# Patient Record
Sex: FEMALE | Race: WHITE | Employment: UNEMPLOYED | URBAN - METROPOLITAN AREA
[De-identification: names, ages, dates, MRNs, and addresses within clinical notes are randomized per-mention and may not be internally consistent; named-entity substitution may affect disease eponyms.]

---

## 2017-10-25 ENCOUNTER — OFFICE VISIT (OUTPATIENT)
Dept: ORTHOPEDIC SURGERY | Age: 42
End: 2017-10-25

## 2017-10-25 VITALS
DIASTOLIC BLOOD PRESSURE: 64 MMHG | WEIGHT: 143.08 LBS | SYSTOLIC BLOOD PRESSURE: 107 MMHG | HEART RATE: 81 BPM | HEIGHT: 65 IN | BODY MASS INDEX: 23.84 KG/M2

## 2017-10-25 DIAGNOSIS — S46.811A STRAIN OF SUPRASPINATUS MUSCLE OR TENDON, RIGHT, INITIAL ENCOUNTER: Primary | ICD-10-CM

## 2017-10-25 PROBLEM — S46.819A STRAIN OF SUPRASPINATUS MUSCLE OR TENDON: Status: ACTIVE | Noted: 2017-10-25

## 2017-10-25 PROCEDURE — 99213 OFFICE O/P EST LOW 20 MIN: CPT | Performed by: ORTHOPAEDIC SURGERY

## 2019-03-07 ENCOUNTER — OFFICE VISIT (OUTPATIENT)
Dept: FAMILY MEDICINE CLINIC | Age: 44
End: 2019-03-07
Payer: COMMERCIAL

## 2019-03-07 ENCOUNTER — HOSPITAL ENCOUNTER (OUTPATIENT)
Dept: GENERAL RADIOLOGY | Age: 44
Discharge: HOME | End: 2019-03-07
Payer: COMMERCIAL

## 2019-03-07 VITALS
DIASTOLIC BLOOD PRESSURE: 68 MMHG | WEIGHT: 158 LBS | BODY MASS INDEX: 26.33 KG/M2 | SYSTOLIC BLOOD PRESSURE: 118 MMHG | OXYGEN SATURATION: 98 % | HEART RATE: 74 BPM | HEIGHT: 65 IN

## 2019-03-07 DIAGNOSIS — Z13.220 SCREENING FOR LIPID DISORDERS: ICD-10-CM

## 2019-03-07 DIAGNOSIS — R06.02 SHORTNESS OF BREATH: Primary | ICD-10-CM

## 2019-03-07 DIAGNOSIS — Z11.4 ENCOUNTER FOR SCREENING FOR HIV: ICD-10-CM

## 2019-03-07 DIAGNOSIS — C44.41 BASAL CELL CARCINOMA (BCC) OF SCALP: ICD-10-CM

## 2019-03-07 DIAGNOSIS — Z13.1 SCREENING FOR DIABETES MELLITUS: ICD-10-CM

## 2019-03-07 LAB — HBA1C MFR BLD: 5.5 %

## 2019-03-07 PROCEDURE — 83036 HEMOGLOBIN GLYCOSYLATED A1C: CPT | Performed by: FAMILY MEDICINE

## 2019-03-07 PROCEDURE — 36415 COLL VENOUS BLD VENIPUNCTURE: CPT | Performed by: FAMILY MEDICINE

## 2019-03-07 PROCEDURE — 99203 OFFICE O/P NEW LOW 30 MIN: CPT | Performed by: FAMILY MEDICINE

## 2019-03-07 PROCEDURE — 71046 X-RAY EXAM CHEST 2 VIEWS: CPT

## 2019-03-07 ASSESSMENT — PATIENT HEALTH QUESTIONNAIRE - PHQ9
1. LITTLE INTEREST OR PLEASURE IN DOING THINGS: 0
SUM OF ALL RESPONSES TO PHQ QUESTIONS 1-9: 0
SUM OF ALL RESPONSES TO PHQ QUESTIONS 1-9: 0
SUM OF ALL RESPONSES TO PHQ9 QUESTIONS 1 & 2: 0
2. FEELING DOWN, DEPRESSED OR HOPELESS: 0

## 2019-03-07 ASSESSMENT — ENCOUNTER SYMPTOMS
SPUTUM PRODUCTION: 0
SHORTNESS OF BREATH: 1
WHEEZING: 0

## 2019-03-08 LAB
A/G RATIO: 1.8 (ref 1.1–2.2)
ALBUMIN SERPL-MCNC: 4.5 G/DL (ref 3.4–5)
ALP BLD-CCNC: 68 U/L (ref 40–129)
ALT SERPL-CCNC: 9 U/L (ref 10–40)
ANION GAP SERPL CALCULATED.3IONS-SCNC: 12 MMOL/L (ref 3–16)
AST SERPL-CCNC: 13 U/L (ref 15–37)
BASOPHILS ABSOLUTE: 0.1 K/UL (ref 0–0.2)
BASOPHILS RELATIVE PERCENT: 0.7 %
BILIRUB SERPL-MCNC: <0.2 MG/DL (ref 0–1)
BUN BLDV-MCNC: 11 MG/DL (ref 7–20)
CALCIUM SERPL-MCNC: 9.2 MG/DL (ref 8.3–10.6)
CHLORIDE BLD-SCNC: 102 MMOL/L (ref 99–110)
CHOLESTEROL, TOTAL: 153 MG/DL (ref 0–199)
CO2: 28 MMOL/L (ref 21–32)
CREAT SERPL-MCNC: 0.6 MG/DL (ref 0.6–1.1)
EOSINOPHILS ABSOLUTE: 0.4 K/UL (ref 0–0.6)
EOSINOPHILS RELATIVE PERCENT: 4.8 %
FERRITIN: 43.5 NG/ML (ref 15–150)
GFR AFRICAN AMERICAN: >60
GFR NON-AFRICAN AMERICAN: >60
GLOBULIN: 2.5 G/DL
GLUCOSE BLD-MCNC: 93 MG/DL (ref 70–99)
HCT VFR BLD CALC: 38.1 % (ref 36–48)
HDLC SERPL-MCNC: 60 MG/DL (ref 40–60)
HEMOGLOBIN: 12.6 G/DL (ref 12–16)
HIV AG/AB: NORMAL
HIV ANTIGEN: NORMAL
HIV-1 ANTIBODY: NORMAL
HIV-2 AB: NORMAL
IRON SATURATION: 27 % (ref 15–50)
IRON: 82 UG/DL (ref 37–145)
LDL CHOLESTEROL CALCULATED: 67 MG/DL
LYMPHOCYTES ABSOLUTE: 2.9 K/UL (ref 1–5.1)
LYMPHOCYTES RELATIVE PERCENT: 37.2 %
MCH RBC QN AUTO: 30.3 PG (ref 26–34)
MCHC RBC AUTO-ENTMCNC: 33 G/DL (ref 31–36)
MCV RBC AUTO: 92 FL (ref 80–100)
MONOCYTES ABSOLUTE: 0.5 K/UL (ref 0–1.3)
MONOCYTES RELATIVE PERCENT: 6.9 %
NEUTROPHILS ABSOLUTE: 3.9 K/UL (ref 1.7–7.7)
NEUTROPHILS RELATIVE PERCENT: 50.4 %
PDW BLD-RTO: 13.1 % (ref 12.4–15.4)
PLATELET # BLD: 333 K/UL (ref 135–450)
PMV BLD AUTO: 10.1 FL (ref 5–10.5)
POTASSIUM SERPL-SCNC: 4.7 MMOL/L (ref 3.5–5.1)
RBC # BLD: 4.15 M/UL (ref 4–5.2)
SODIUM BLD-SCNC: 142 MMOL/L (ref 136–145)
TOTAL IRON BINDING CAPACITY: 303 UG/DL (ref 260–445)
TOTAL PROTEIN: 7 G/DL (ref 6.4–8.2)
TRIGL SERPL-MCNC: 132 MG/DL (ref 0–150)
TSH REFLEX: 2.46 UIU/ML (ref 0.27–4.2)
VLDLC SERPL CALC-MCNC: 26 MG/DL
WBC # BLD: 7.7 K/UL (ref 4–11)

## 2019-03-19 ENCOUNTER — HOSPITAL ENCOUNTER (OUTPATIENT)
Dept: CT IMAGING | Age: 44
Discharge: HOME | End: 2019-03-19
Payer: COMMERCIAL

## 2019-03-19 DIAGNOSIS — C50.312 MALIGNANT NEOPLASM OF LOWER-INNER QUADRANT OF LEFT FEMALE BREAST, UNSPECIFIED ESTROGEN RECEPTOR STATUS (HCC): ICD-10-CM

## 2019-03-19 PROCEDURE — 71260 CT THORAX DX C+: CPT

## 2019-03-19 PROCEDURE — 6360000004 HC RX CONTRAST MEDICATION: Performed by: INTERNAL MEDICINE

## 2019-03-19 RX ADMIN — IOPAMIDOL 85 ML: 755 INJECTION, SOLUTION INTRAVENOUS at 16:33

## 2019-04-03 ENCOUNTER — HOSPITAL ENCOUNTER (OUTPATIENT)
Dept: CT IMAGING | Age: 44
Discharge: HOME | End: 2019-04-03
Payer: COMMERCIAL

## 2019-04-03 DIAGNOSIS — C50.312 MALIGNANT NEOPLASM OF LOWER-INNER QUADRANT OF LEFT FEMALE BREAST, UNSPECIFIED ESTROGEN RECEPTOR STATUS (HCC): ICD-10-CM

## 2019-04-03 PROCEDURE — 74170 CT ABD WO CNTRST FLWD CNTRST: CPT

## 2019-04-03 PROCEDURE — 6360000004 HC RX CONTRAST MEDICATION: Performed by: INTERNAL MEDICINE

## 2019-04-03 RX ADMIN — IOPAMIDOL 75 ML: 755 INJECTION, SOLUTION INTRAVENOUS at 10:04

## 2019-04-09 ENCOUNTER — APPOINTMENT (OUTPATIENT)
Dept: MRI IMAGING | Age: 44
End: 2019-04-09
Payer: COMMERCIAL

## 2019-04-09 ENCOUNTER — HOSPITAL ENCOUNTER (OUTPATIENT)
Dept: MRI IMAGING | Age: 44
Discharge: HOME | End: 2019-04-09
Payer: COMMERCIAL

## 2019-04-09 DIAGNOSIS — C50.312 MALIGNANT NEOPLASM OF LOWER-INNER QUADRANT OF LEFT FEMALE BREAST, UNSPECIFIED ESTROGEN RECEPTOR STATUS (HCC): ICD-10-CM

## 2019-04-09 DIAGNOSIS — Z17.0 MALIGNANT NEOPLASM OF LOWER-INNER QUADRANT OF LEFT BREAST IN FEMALE, ESTROGEN RECEPTOR POSITIVE (HCC): ICD-10-CM

## 2019-04-09 DIAGNOSIS — C50.312 MALIGNANT NEOPLASM OF LOWER-INNER QUADRANT OF LEFT BREAST IN FEMALE, ESTROGEN RECEPTOR POSITIVE (HCC): ICD-10-CM

## 2019-04-09 PROCEDURE — A9579 GAD-BASE MR CONTRAST NOS,1ML: HCPCS | Performed by: INTERNAL MEDICINE

## 2019-04-09 PROCEDURE — 74183 MRI ABD W/O CNTR FLWD CNTR: CPT

## 2019-04-09 PROCEDURE — 6360000004 HC RX CONTRAST MEDICATION: Performed by: INTERNAL MEDICINE

## 2019-04-09 RX ADMIN — GADOTERIDOL 14 ML: 279.3 INJECTION, SOLUTION INTRAVENOUS at 08:30

## 2019-11-07 ENCOUNTER — OFFICE VISIT (OUTPATIENT)
Dept: ORTHOPEDIC SURGERY | Age: 44
End: 2019-11-07
Payer: COMMERCIAL

## 2019-11-07 VITALS
WEIGHT: 158.07 LBS | BODY MASS INDEX: 26.34 KG/M2 | SYSTOLIC BLOOD PRESSURE: 106 MMHG | HEIGHT: 65 IN | HEART RATE: 81 BPM | DIASTOLIC BLOOD PRESSURE: 65 MMHG

## 2019-11-07 DIAGNOSIS — M51.36 DDD (DEGENERATIVE DISC DISEASE), LUMBAR: ICD-10-CM

## 2019-11-07 DIAGNOSIS — M47.816 SPONDYLOSIS WITHOUT MYELOPATHY OR RADICULOPATHY, LUMBAR REGION: ICD-10-CM

## 2019-11-07 DIAGNOSIS — M54.50 PAIN OF LUMBAR SPINE: ICD-10-CM

## 2019-11-07 DIAGNOSIS — M53.3 SACROILIAC JOINT DYSFUNCTION OF BOTH SIDES: Primary | ICD-10-CM

## 2019-11-07 PROCEDURE — 99204 OFFICE O/P NEW MOD 45 MIN: CPT | Performed by: PHYSICIAN ASSISTANT

## 2019-11-07 RX ORDER — METHYLPREDNISOLONE 4 MG/1
TABLET ORAL
Qty: 1 KIT | Refills: 0 | Status: SHIPPED | OUTPATIENT
Start: 2019-11-07 | End: 2020-02-27 | Stop reason: ALTCHOICE

## 2019-11-27 ENCOUNTER — TELEPHONE (OUTPATIENT)
Dept: ORTHOPEDIC SURGERY | Age: 44
End: 2019-11-27

## 2019-11-27 DIAGNOSIS — M47.816 SPONDYLOSIS WITHOUT MYELOPATHY OR RADICULOPATHY, LUMBAR REGION: ICD-10-CM

## 2019-11-27 DIAGNOSIS — M53.3 SACROILIAC JOINT DYSFUNCTION OF BOTH SIDES: Primary | ICD-10-CM

## 2019-11-27 DIAGNOSIS — M51.36 DDD (DEGENERATIVE DISC DISEASE), LUMBAR: ICD-10-CM

## 2020-02-27 ENCOUNTER — OFFICE VISIT (OUTPATIENT)
Dept: FAMILY MEDICINE CLINIC | Age: 45
End: 2020-02-27
Payer: COMMERCIAL

## 2020-02-27 VITALS
OXYGEN SATURATION: 98 % | SYSTOLIC BLOOD PRESSURE: 126 MMHG | HEART RATE: 63 BPM | DIASTOLIC BLOOD PRESSURE: 78 MMHG | WEIGHT: 156 LBS | BODY MASS INDEX: 25.96 KG/M2

## 2020-02-27 PROCEDURE — 99396 PREV VISIT EST AGE 40-64: CPT | Performed by: FAMILY MEDICINE

## 2020-02-27 ASSESSMENT — PATIENT HEALTH QUESTIONNAIRE - PHQ9
1. LITTLE INTEREST OR PLEASURE IN DOING THINGS: 0
SUM OF ALL RESPONSES TO PHQ9 QUESTIONS 1 & 2: 0
SUM OF ALL RESPONSES TO PHQ QUESTIONS 1-9: 0
SUM OF ALL RESPONSES TO PHQ QUESTIONS 1-9: 0
2. FEELING DOWN, DEPRESSED OR HOPELESS: 0

## 2020-07-21 ENCOUNTER — TELEPHONE (OUTPATIENT)
Dept: UROGYNECOLOGY | Age: 45
End: 2020-07-21

## 2020-08-21 ENCOUNTER — OFFICE VISIT (OUTPATIENT)
Dept: FAMILY MEDICINE CLINIC | Age: 45
End: 2020-08-21
Payer: COMMERCIAL

## 2020-08-21 ENCOUNTER — TELEPHONE (OUTPATIENT)
Dept: FAMILY MEDICINE CLINIC | Age: 45
End: 2020-08-21

## 2020-08-21 VITALS
BODY MASS INDEX: 25.16 KG/M2 | TEMPERATURE: 97.1 F | HEART RATE: 87 BPM | WEIGHT: 151.2 LBS | DIASTOLIC BLOOD PRESSURE: 70 MMHG | OXYGEN SATURATION: 96 % | SYSTOLIC BLOOD PRESSURE: 110 MMHG

## 2020-08-21 PROCEDURE — 99213 OFFICE O/P EST LOW 20 MIN: CPT | Performed by: FAMILY MEDICINE

## 2020-08-21 ASSESSMENT — ENCOUNTER SYMPTOMS: COUGH: 0

## 2020-08-21 NOTE — PROGRESS NOTES
Gregor Faith is a 54 y.o. female    Chief Complaint   Patient presents with    Otalgia     Right ear pain x 1 week. Took her last prednisone this AM       HPI:    Otalgia    There is pain in the right ear. This is a new problem. The current episode started in the past 7 days. The problem has been waxing and waning. There has been no fever. Pertinent negatives include no coughing or ear discharge. Treatments tried: prednisone. The treatment provided moderate relief. There is no history of hearing loss. ROS:    Review of Systems   HENT: Positive for ear pain. Negative for ear discharge. Respiratory: Negative for cough. /70 (Site: Right Upper Arm, Position: Sitting, Cuff Size: Medium Adult)   Pulse 87   Temp 97.1 °F (36.2 °C) (Temporal)   Wt 151 lb 3.2 oz (68.6 kg)   SpO2 96%   BMI 25.16 kg/m²     Physical Exam:    Physical Exam  Constitutional:       General: She is not in acute distress. Appearance: Normal appearance. She is normal weight. She is not ill-appearing. HENT:      Head: Normocephalic. Right Ear: A middle ear effusion is present. Tympanic membrane is not perforated or erythematous. Left Ear: Tympanic membrane normal.   Neurological:      Mental Status: She is alert. Psychiatric:         Mood and Affect: Mood normal.         Behavior: Behavior normal.         Thought Content: Thought content normal.         No current outpatient medications on file. No current facility-administered medications for this visit. Assessment:    1. Fluid level behind tympanic membrane of right ear        Plan:    1. Fluid level behind tympanic membrane of right ear  No erythema, so no antibiotic needed at this time. Discussed trying Zyrtec-D 12 Hour. The steroid helped so it is likely more so your effusion or eustachian tube dysfunction. Also consider Flonase. Patient to return to clinic if symptoms worsen or fail to improve.

## 2020-08-21 NOTE — PATIENT INSTRUCTIONS
Please consider getting Zyrtec-D 12 hour behind the pharmacy counter. The 30 mg dose is a little low.

## 2020-08-21 NOTE — TELEPHONE ENCOUNTER
----- Message from Milburn Lanes sent at 8/21/2020  7:20 AM EDT -----  Subject: Appointment Request    Reason for Call: Routine (Patient Request) No Script    QUESTIONS  Type of Appointment? Established Patient  Reason for appointment request? Available appointments did not meet   patient need  Additional Information for Provider? Pt. is requesting an OV due pain in   right ear. She would like for it to be looked at in person. Please give   patient pt. a call to schedule - she is avail any day/time.  ---------------------------------------------------------------------------  --------------  CALL BACK INFO  What is the best way for the office to contact you? OK to leave message on   voicemail  Preferred Call Back Phone Number? [phone]  ---------------------------------------------------------------------------  --------------  SCRIPT ANSWERS  Relationship to Patient? Self  Appointment reason? Symptomatic  Select script based on patient symptoms? Adult No Script  (Patient requests to see the provider urgently  today or tomorrow. )? No  (Is the patient requesting to see the provider for a procedure?)? No  (Is the patient requesting to be seen routinely (not today or tomorrow)? Yes  Have you been diagnosed with   tested for   or told that you are suspected of having COVID-19 (Coronavirus)? No  Have you had a fever or taken medication to treat a fever within the past   3 days? No  Have you had a cough   shortness of breath or flu-like symptoms within the past 3 days? No  Do you currently have flu-like symptoms including fever or chills   cough   shortness of breath   or difficulty breathing   or new loss of taste or smell? No  (Service Expert  click yes below to proceed with [employer] As Usual   Scheduling)?  Yes

## 2020-09-21 ENCOUNTER — HOSPITAL ENCOUNTER (OUTPATIENT)
Dept: MRI IMAGING | Age: 45
Discharge: HOME | End: 2020-09-21
Payer: COMMERCIAL

## 2020-09-21 PROCEDURE — A9581 GADOXETATE DISODIUM INJ: HCPCS | Performed by: INTERNAL MEDICINE

## 2020-09-21 PROCEDURE — 74183 MRI ABD W/O CNTR FLWD CNTR: CPT

## 2020-09-21 PROCEDURE — 6360000004 HC RX CONTRAST MEDICATION: Performed by: INTERNAL MEDICINE

## 2020-09-21 RX ADMIN — GADOXETATE DISODIUM 6 ML: 181.43 INJECTION, SOLUTION INTRAVENOUS at 16:50

## 2020-09-23 ENCOUNTER — HOSPITAL ENCOUNTER (OUTPATIENT)
Age: 45
Discharge: HOME | End: 2020-09-23
Payer: COMMERCIAL

## 2020-09-23 ENCOUNTER — HOSPITAL ENCOUNTER (OUTPATIENT)
Dept: CT IMAGING | Age: 45
Discharge: HOME | End: 2020-09-23
Payer: COMMERCIAL

## 2020-09-23 VITALS
BODY MASS INDEX: 25.33 KG/M2 | HEART RATE: 85 BPM | SYSTOLIC BLOOD PRESSURE: 108 MMHG | TEMPERATURE: 98.4 F | OXYGEN SATURATION: 98 % | WEIGHT: 152 LBS | HEIGHT: 65 IN | RESPIRATION RATE: 17 BRPM | DIASTOLIC BLOOD PRESSURE: 74 MMHG

## 2020-09-23 LAB
INR BLD: 0.97 (ref 0.86–1.14)
PROTHROMBIN TIME: 11.3 SEC (ref 10–13.2)

## 2020-09-23 PROCEDURE — 6370000000 HC RX 637 (ALT 250 FOR IP): Performed by: RADIOLOGY

## 2020-09-23 PROCEDURE — 47000 NEEDLE BIOPSY OF LIVER PERQ: CPT

## 2020-09-23 PROCEDURE — 88342 IMHCHEM/IMCYTCHM 1ST ANTB: CPT

## 2020-09-23 PROCEDURE — 88341 IMHCHEM/IMCYTCHM EA ADD ANTB: CPT

## 2020-09-23 PROCEDURE — 36415 COLL VENOUS BLD VENIPUNCTURE: CPT

## 2020-09-23 PROCEDURE — 6360000002 HC RX W HCPCS: Performed by: RADIOLOGY

## 2020-09-23 PROCEDURE — 85610 PROTHROMBIN TIME: CPT

## 2020-09-23 PROCEDURE — 88307 TISSUE EXAM BY PATHOLOGIST: CPT

## 2020-09-23 PROCEDURE — 88360 TUMOR IMMUNOHISTOCHEM/MANUAL: CPT

## 2020-09-23 PROCEDURE — 88333 PATH CONSLTJ SURG CYTO XM 1: CPT

## 2020-09-23 RX ORDER — FENTANYL CITRATE 50 UG/ML
INJECTION, SOLUTION INTRAMUSCULAR; INTRAVENOUS
Status: COMPLETED | OUTPATIENT
Start: 2020-09-23 | End: 2020-09-23

## 2020-09-23 RX ORDER — MIDAZOLAM HYDROCHLORIDE 5 MG/ML
INJECTION INTRAMUSCULAR; INTRAVENOUS
Status: COMPLETED | OUTPATIENT
Start: 2020-09-23 | End: 2020-09-23

## 2020-09-23 RX ORDER — OXYCODONE HYDROCHLORIDE 5 MG/1
5 TABLET ORAL ONCE
Status: COMPLETED | OUTPATIENT
Start: 2020-09-23 | End: 2020-09-23

## 2020-09-23 RX ORDER — ONDANSETRON 2 MG/ML
INJECTION INTRAMUSCULAR; INTRAVENOUS
Status: COMPLETED | OUTPATIENT
Start: 2020-09-23 | End: 2020-09-23

## 2020-09-23 RX ADMIN — FENTANYL CITRATE 50 MCG: 50 INJECTION INTRAMUSCULAR; INTRAVENOUS at 08:41

## 2020-09-23 RX ADMIN — MIDAZOLAM HYDROCHLORIDE 1 MG: 5 INJECTION, SOLUTION INTRAMUSCULAR; INTRAVENOUS at 08:58

## 2020-09-23 RX ADMIN — OXYCODONE 5 MG: 5 TABLET ORAL at 12:53

## 2020-09-23 RX ADMIN — ONDANSETRON 2 MG: 2 INJECTION, SOLUTION INTRAMUSCULAR; INTRAVENOUS at 08:59

## 2020-09-23 RX ADMIN — MIDAZOLAM HYDROCHLORIDE 1 MG: 5 INJECTION, SOLUTION INTRAMUSCULAR; INTRAVENOUS at 08:41

## 2020-09-23 RX ADMIN — FENTANYL CITRATE 50 MCG: 50 INJECTION INTRAMUSCULAR; INTRAVENOUS at 08:58

## 2020-09-23 RX ADMIN — OXYCODONE HYDROCHLORIDE 5 MG: 5 TABLET ORAL at 09:34

## 2020-09-23 ASSESSMENT — PAIN SCALES - GENERAL
PAINLEVEL_OUTOF10: 5
PAINLEVEL_OUTOF10: 5

## 2020-09-23 NOTE — BRIEF OP NOTE
Brief Postoperative Note    Melyssa Damon  YOB: 1975  [id number]    Pre-operative Diagnosis: liver lesions    Post-operative Diagnosis: Same    Procedure: US-guided liver lesion biopsy    Anesthesia: Moderate Sedation    Surgeons: Keith Wong MD    Estimated Blood Loss: Less than 5 mL    Complications: None    Specimens: Was Obtained: 4 18g cores    Findings: Successful US-guided liver lesion biopsy.     Electronically signed by Keith Wong MD on 9/23/2020 at 9:21 AM

## 2020-09-23 NOTE — PRE SEDATION
Sedation Pre-Procedure Note    Patient Name: Andry Tan   YOB: 1975  Room/Bed: Room/bed info not found  Medical Record Number: [id number]  Date: 9/23/2020   Time: 9:20 AM       Indication:  Liver lesions here for biopsy. Consent: I have discussed with the patient and/or the patient representative the indication, alternatives, and the possible risks and/or complications of the planned procedure and the anesthesia methods. The patient and/or patient representative appear to understand and agree to proceed. Vital Signs:   Vitals:    09/23/20 0900   BP: 99/68   Pulse: 79   Resp: 18   Temp:    SpO2: 96%       Past Medical History:   has a past medical history of Allergic rhinitis and BCC (basal cell carcinoma of skin). Past Surgical History:   has a past surgical history that includes Tonsillectomy; Mohs surgery; Breast surgery (Left, 06/2016); Mastectomy (Left, 08/22/2016); and Mastectomy. Medications:   Scheduled Meds:   Continuous Infusions:   PRN Meds:   Home Meds:   Prior to Admission medications    Not on File     Coumadin Use Last 7 Days:  no  Antiplatelet drug therapy use last 7 days: no  Other anticoagulant use last 7 days: no  Additional Medication Information:  n/a      Pre-Sedation Documentation and Exam:   I have reviewed the patient's history and review of systems.     Mallampati Airway Assessment:  Mallampati Class II - (soft palate, fauces & uvula are visible)    Prior History of Anesthesia Complications:   none    ASA Classification:  Class 3 - A patient with severe systemic disease that limits activity but is not incapacitating    Sedation/ Anesthesia Plan:   intravenous sedation    Medications Planned:   midazolam (Versed) intravenously and fentanyl intravenously    Patient is an appropriate candidate for plan of sedation: yes    Electronically signed by Ramírez Layton MD on 9/23/2020 at 9:20 AM

## 2020-09-23 NOTE — PROGRESS NOTES
Image guided liverlesion biopsy completed. Four core samples obtained and sent to lab for pathology. Pt tolerated procedure without any signs or symptoms of distress. Vital signs stable see flow sheets. Biopsy site dressing clean, dry, and intact. Denies any pain. Post Anton score 10. Pt taken to recovery bay for post operative monitoring.

## 2020-09-23 NOTE — PROGRESS NOTES
Pt arrived for image guided liver lesion biopsy. Procedure explained including the risk and benefit of the procedure. All questions answered. Pt verbalizes understanding of the procedure and states no more questions. Consent signed. Vital signs stable see flow sheets. Labs, allergies, medications, and code status reviewed. No contraindications noted.  Pre Anton score 10.

## 2020-10-01 ENCOUNTER — HOSPITAL ENCOUNTER (OUTPATIENT)
Dept: NUCLEAR MEDICINE | Age: 45
Discharge: HOME | End: 2020-10-01
Payer: COMMERCIAL

## 2020-10-01 ENCOUNTER — HOSPITAL ENCOUNTER (OUTPATIENT)
Dept: CT IMAGING | Age: 45
Discharge: HOME | End: 2020-10-01
Payer: COMMERCIAL

## 2020-10-01 PROCEDURE — 74177 CT ABD & PELVIS W/CONTRAST: CPT

## 2020-10-01 PROCEDURE — 78306 BONE IMAGING WHOLE BODY: CPT

## 2020-10-01 PROCEDURE — 3430000000 HC RX DIAGNOSTIC RADIOPHARMACEUTICAL: Performed by: INTERNAL MEDICINE

## 2020-10-01 PROCEDURE — 6360000004 HC RX CONTRAST MEDICATION: Performed by: INTERNAL MEDICINE

## 2020-10-01 PROCEDURE — A9503 TC99M MEDRONATE: HCPCS | Performed by: INTERNAL MEDICINE

## 2020-10-01 RX ORDER — TC 99M MEDRONATE 20 MG/10ML
26.5 INJECTION, POWDER, LYOPHILIZED, FOR SOLUTION INTRAVENOUS
Status: COMPLETED | OUTPATIENT
Start: 2020-10-01 | End: 2020-10-01

## 2020-10-01 RX ADMIN — TC 99M MEDRONATE 26.5 MILLICURIE: 20 INJECTION, POWDER, LYOPHILIZED, FOR SOLUTION INTRAVENOUS at 11:34

## 2020-10-01 RX ADMIN — IOHEXOL 50 ML: 240 INJECTION, SOLUTION INTRATHECAL; INTRAVASCULAR; INTRAVENOUS; ORAL at 12:22

## 2020-10-01 RX ADMIN — IOPAMIDOL 80 ML: 755 INJECTION, SOLUTION INTRAVENOUS at 12:22

## 2020-10-09 ENCOUNTER — HOSPITAL ENCOUNTER (OUTPATIENT)
Dept: NON INVASIVE DIAGNOSTICS | Age: 45
Discharge: HOME | End: 2020-10-09
Payer: COMMERCIAL

## 2020-10-09 LAB
LV EF: 63 %
LVEF MODALITY: NORMAL

## 2020-10-09 PROCEDURE — 93306 TTE W/DOPPLER COMPLETE: CPT

## 2020-10-09 PROCEDURE — 93356 MYOCRD STRAIN IMG SPCKL TRCK: CPT

## 2020-10-14 NOTE — PROGRESS NOTES
[facility] ([facility]) Fina Rodarte [address] [facility], [address] [facility]    Acknowledgment of Informed Consent for Surgical or Medical Procedure and Sedation  I agree to allow doctor(s) Lucie Fontenot and his/her associates or assistants, including residents and/or other qualified medical practitioner to perform the following medical treatment or procedure and to administer or direct the administration of sedation as necessary:  Procedure(s): PORT-A-CATHETER INSERTION  My doctor has explained the following regarding the proposed procedure:   the explanation of the procedure   the benefits of the procedure   the potential problems that might occur during recuperation   the risks and side effects of the procedure which could include but are not limited to severe blood loss, infection, stroke or death   the benefits, risks and side effect of alternative procedures including the consequences of declining this procedure or any alternative procedures   the likelihood of achieving satisfactory results. I acknowledge no guarantee or assurance has been made to me regarding the results. I understand that during the course of this treatment/procedure, unforeseen conditions can occur which require an additional or different procedure. I agree to allow my physician or assistants to perform such extension of the original procedure as they may find necessary. I understand that sedation will often result in temporary impairment of memory and fine motor skills and that sedation can occasionally progress to a state of deep sedation or general anesthesia. I understand the risks of anesthesia for surgery include, but are not limited to, sore throat, hoarseness, injury to face, mouth, or teeth; nausea; headache; injury to blood vessels or nerves; death, brain damage, or paralysis.     I understand that if I have a Limitation of Treatment order in effect during my hospitalization, the order may or may not be in effect during this procedure. I give my doctor permission to give me blood or blood products. I understand that there are risks with receiving blood such as hepatitis, AIDS, fever, or allergic reaction. I acknowledge that the risks, benefits, and alternatives of this treatment have been explained to me and that no express or implied warranty has been given by the hospital, any blood bank, or any person or entity as to the blood or blood components transfused. At the discretion of my doctor, I agree to allow observers, equipment/product representatives and allow photographing, and/or televising of the procedure, provided my name or identity is maintained confidentially. I agree the hospital may dispose of or use for scientific or educational purposes any tissue, fluid, or body parts which may be removed.     ________________________________Date________Time______ am/pm  ([facility])  Patient or Signature of Closest Relative or Legal Guardian    ________________________________Date________Time______am/pm      Page 1 of  1  Witness

## 2020-10-15 ENCOUNTER — NURSE ONLY (OUTPATIENT)
Dept: PRIMARY CARE CLINIC | Age: 45
End: 2020-10-15
Payer: COMMERCIAL

## 2020-10-15 PROCEDURE — 99211 OFF/OP EST MAY X REQ PHY/QHP: CPT | Performed by: NURSE PRACTITIONER

## 2020-10-17 LAB — SARS-COV-2, NAA: NOT DETECTED

## 2020-10-18 NOTE — RESULT ENCOUNTER NOTE

## 2020-10-19 NOTE — PROGRESS NOTES
registered at your bedside once brought back to your room. 5. DO NOT EAT ANYTHING eight hours prior to surgery. May have 8 ounces of water 4 hours prior to surgery. 6. MEDICATIONS    Take the following medications with a SMALL sip of water:    Use your usual dose of inhalers the morning of surgery. BRING your rescue inhaler with you to hospital.    Anesthesia does NOT want you to take insulin the morning of surgery. They will control your blood sugar while you are at the hospital. Please contact your ordering physician for instructions regarding your insulin the night before your procedure. If you have an insulin pump, please keep it set on basal rate. 7. Do not swallow water when brushing teeth. No gum, candy, mints or ice chips. Refrain from smoking or at least decrease the amount. 8. Dress in loose, comfortable clothing appropriate for redressing after your procedure. Do not wear jewelry (including body piercings), make-up (especially NO eye make-up), fingernail polish (NO toenail polish if foot/leg surgery), lotion, powders or metal hairclips. 9. Dentures, glasses, or contacts will need to be removed before your procedure. Bring cases for your glasses, contacts, dentures, or hearing aids to protect them while you are in surgery. 10. If you use a CPAP, please bring it with you on the day of your procedure. 11. We recommend that valuable personal  belongings such as cash, cell phones, e-tablets or jewelry, be left at home during your stay. The hospital will not be responsible for valuables that are not secured in the hospital safe. However, if your insurance requires a co-pay, you may want to bring a method of payment, i.e. Check or credit card, if you wish to pay your co-pay the day of surgery. 12. If you are to stay overnight, you may bring a bag with personal items.  Please have any large items you may need brought in by your family after your arrival to your hospital room.    13. If you have a Living Will or Durable Power of , please bring a copy on the day of your procedure. 15. With your permission, one family member may accompany you while you are being prepared for surgery. Once you are ready, additional family members may join you. HOW WE KEEP YOU SAFE and WORK TO PREVENT SURGICAL SITE INFECTIONS:  1. Health care workers should always check your ID bracelet to verify your name and birth date. You will be asked many times to state your name, date of birth, and allergies. 2. Health care workers should always clean their hands with soap or alcohol gel before providing care to you. It is okay to ask anyone if they cleaned their hands before they touch you. 3. You will be actively involved in verifying the type of procedure you are having and ensuring the correct surgical site. This will be confirmed multiple times prior to your procedure. Do NOT callie your surgery site UNLESS instructed to by your surgeon. 4. Do not shave or wax for 72 hours prior to procedure near your operative site. Shaving with a razor can irritate your skin and make it easier to develop an infection. On the day of your procedure, any hair that needs to be removed near the surgical site will be clipped by a healthcare worker using a special clippers designed to avoid skin irritation. 5. When you are in the operating room, your surgical site will be cleansed with a special soap, and in most cases, you will be given an antibiotic before the surgery begins. What to expect AFTER YOUR PROCEDURE:  1. Immediately following your procedure, your will be taken to the PACU for the first phase of your recovery. Your nurse will help you recover from any potential side effects of anesthesia, such as extreme drowsiness, changes in your vital signs or breathing patterns. Nausea, headache, muscle aches, or sore throat may also occur after anesthesia.   Your nurse will help you manage these potential side effects. 2. For comfort and safety, arrange to have someone at home with you for the first 24 hours after discharge. 3. You and your family will be given written instructions about your diet, activity, dressing care, medications, and return visits. 4. Once at home, should issues with nausea, pain, or bleeding occur, or should you notice any signs of infection, you should call your surgeon. 5. Always clean your hands before and after caring for your wound. Do not let your family touch your surgery site without cleaning their hands. 6. Narcotic pain medications can cause significant constipation. You may want to add a stool softener to your postoperative medication schedule or speak to your surgeon on how best to manage this SIDE EFFECT. SPECIAL INSTRUCTIONS     Thank you for allowing us to care for you. We strive to exceed your expectations in the delivery of care and service provided to you and your family. If you need to contact us for any reason, please call us at [phone]    Instructions reviewed with patient during preadmission testing phone interview. Elidia Lomax. 10/19/2020 .12:03 PM      ADDITIONAL EDUCATIONAL INFORMATION REVIEWED PER PHONE WITH YOU AND/OR YOUR FAMILY:  Yes Bring a urine sample on day of surgery     Yes Antibacterial Soap

## 2020-10-20 ENCOUNTER — ANESTHESIA EVENT (OUTPATIENT)
Dept: OPERATING ROOM | Age: 45
End: 2020-10-20
Payer: COMMERCIAL

## 2020-10-21 ENCOUNTER — HOSPITAL ENCOUNTER (OUTPATIENT)
Age: 45
Setting detail: OUTPATIENT SURGERY
Discharge: HOME | End: 2020-10-21
Attending: SURGERY | Admitting: SURGERY
Payer: COMMERCIAL

## 2020-10-21 ENCOUNTER — ANESTHESIA (OUTPATIENT)
Dept: OPERATING ROOM | Age: 45
End: 2020-10-21
Payer: COMMERCIAL

## 2020-10-21 ENCOUNTER — APPOINTMENT (OUTPATIENT)
Dept: GENERAL RADIOLOGY | Age: 45
End: 2020-10-21
Attending: SURGERY
Payer: COMMERCIAL

## 2020-10-21 VITALS
OXYGEN SATURATION: 97 % | HEART RATE: 64 BPM | SYSTOLIC BLOOD PRESSURE: 108 MMHG | HEIGHT: 65 IN | RESPIRATION RATE: 14 BRPM | TEMPERATURE: 98.5 F | DIASTOLIC BLOOD PRESSURE: 72 MMHG | BODY MASS INDEX: 23.66 KG/M2 | WEIGHT: 142 LBS

## 2020-10-21 VITALS — SYSTOLIC BLOOD PRESSURE: 93 MMHG | OXYGEN SATURATION: 93 % | DIASTOLIC BLOOD PRESSURE: 62 MMHG

## 2020-10-21 LAB — PREGNANCY, URINE: NEGATIVE

## 2020-10-21 PROCEDURE — 7100000011 HC PHASE II RECOVERY - ADDTL 15 MIN: Performed by: SURGERY

## 2020-10-21 PROCEDURE — 3700000001 HC ADD 15 MINUTES (ANESTHESIA): Performed by: SURGERY

## 2020-10-21 PROCEDURE — 2500000003 HC RX 250 WO HCPCS: Performed by: SURGERY

## 2020-10-21 PROCEDURE — C1788 PORT, INDWELLING, IMP: HCPCS | Performed by: SURGERY

## 2020-10-21 PROCEDURE — 7100000010 HC PHASE II RECOVERY - FIRST 15 MIN: Performed by: SURGERY

## 2020-10-21 PROCEDURE — 3600000004 HC SURGERY LEVEL 4 BASE: Performed by: SURGERY

## 2020-10-21 PROCEDURE — 71045 X-RAY EXAM CHEST 1 VIEW: CPT

## 2020-10-21 PROCEDURE — 2580000003 HC RX 258: Performed by: ANESTHESIOLOGY

## 2020-10-21 PROCEDURE — 88305 TISSUE EXAM BY PATHOLOGIST: CPT

## 2020-10-21 PROCEDURE — 7100000000 HC PACU RECOVERY - FIRST 15 MIN: Performed by: SURGERY

## 2020-10-21 PROCEDURE — 7100000001 HC PACU RECOVERY - ADDTL 15 MIN: Performed by: SURGERY

## 2020-10-21 PROCEDURE — 6360000002 HC RX W HCPCS: Performed by: SURGERY

## 2020-10-21 PROCEDURE — 84703 CHORIONIC GONADOTROPIN ASSAY: CPT

## 2020-10-21 PROCEDURE — 2580000003 HC RX 258: Performed by: SURGERY

## 2020-10-21 PROCEDURE — 3700000000 HC ANESTHESIA ATTENDED CARE: Performed by: SURGERY

## 2020-10-21 PROCEDURE — 6360000002 HC RX W HCPCS: Performed by: NURSE ANESTHETIST, CERTIFIED REGISTERED

## 2020-10-21 PROCEDURE — 3600000014 HC SURGERY LEVEL 4 ADDTL 15MIN: Performed by: SURGERY

## 2020-10-21 PROCEDURE — 77001 FLUOROGUIDE FOR VEIN DEVICE: CPT

## 2020-10-21 PROCEDURE — 2709999900 HC NON-CHARGEABLE SUPPLY: Performed by: SURGERY

## 2020-10-21 PROCEDURE — 6360000002 HC RX W HCPCS: Performed by: ANESTHESIOLOGY

## 2020-10-21 DEVICE — PORT INFUS 6FR PLAS PWR INJ ATTCH POLYUR CATH SIL FILL SUT: Type: IMPLANTABLE DEVICE | Site: SUBCLAVIAN | Status: FUNCTIONAL

## 2020-10-21 RX ORDER — LIDOCAINE AND PRILOCAINE 25; 25 MG/G; MG/G
CREAM TOPICAL
Qty: 1 TUBE | Refills: 10 | Status: SHIPPED | OUTPATIENT
Start: 2020-10-21

## 2020-10-21 RX ORDER — FENTANYL CITRATE 50 UG/ML
50 INJECTION, SOLUTION INTRAMUSCULAR; INTRAVENOUS EVERY 5 MIN PRN
Status: DISCONTINUED | OUTPATIENT
Start: 2020-10-21 | End: 2020-10-21 | Stop reason: HOSPADM

## 2020-10-21 RX ORDER — PROPOFOL 10 MG/ML
INJECTION, EMULSION INTRAVENOUS CONTINUOUS PRN
Status: DISCONTINUED | OUTPATIENT
Start: 2020-10-21 | End: 2020-10-21 | Stop reason: SDUPTHER

## 2020-10-21 RX ORDER — HYDRALAZINE HYDROCHLORIDE 20 MG/ML
5 INJECTION INTRAMUSCULAR; INTRAVENOUS EVERY 5 MIN PRN
Status: DISCONTINUED | OUTPATIENT
Start: 2020-10-21 | End: 2020-10-21 | Stop reason: HOSPADM

## 2020-10-21 RX ORDER — LIDOCAINE HYDROCHLORIDE 10 MG/ML
1 INJECTION, SOLUTION EPIDURAL; INFILTRATION; INTRACAUDAL; PERINEURAL
Status: DISCONTINUED | OUTPATIENT
Start: 2020-10-21 | End: 2020-10-21 | Stop reason: HOSPADM

## 2020-10-21 RX ORDER — SODIUM CHLORIDE 0.9 % (FLUSH) 0.9 %
10 SYRINGE (ML) INJECTION PRN
Status: DISCONTINUED | OUTPATIENT
Start: 2020-10-21 | End: 2020-10-21 | Stop reason: HOSPADM

## 2020-10-21 RX ORDER — SODIUM CHLORIDE 0.9 % (FLUSH) 0.9 %
10 SYRINGE (ML) INJECTION EVERY 12 HOURS SCHEDULED
Status: DISCONTINUED | OUTPATIENT
Start: 2020-10-21 | End: 2020-10-21 | Stop reason: HOSPADM

## 2020-10-21 RX ORDER — FENTANYL CITRATE 50 UG/ML
INJECTION, SOLUTION INTRAMUSCULAR; INTRAVENOUS PRN
Status: DISCONTINUED | OUTPATIENT
Start: 2020-10-21 | End: 2020-10-21 | Stop reason: SDUPTHER

## 2020-10-21 RX ORDER — HYDROCODONE BITARTRATE AND ACETAMINOPHEN 5; 325 MG/1; MG/1
1 TABLET ORAL EVERY 6 HOURS PRN
Qty: 8 TABLET | Refills: 0 | Status: SHIPPED | OUTPATIENT
Start: 2020-10-21 | End: 2020-10-23 | Stop reason: ALTCHOICE

## 2020-10-21 RX ORDER — MIDAZOLAM HYDROCHLORIDE 1 MG/ML
INJECTION INTRAMUSCULAR; INTRAVENOUS PRN
Status: DISCONTINUED | OUTPATIENT
Start: 2020-10-21 | End: 2020-10-21 | Stop reason: SDUPTHER

## 2020-10-21 RX ORDER — ONDANSETRON 2 MG/ML
4 INJECTION INTRAMUSCULAR; INTRAVENOUS
Status: COMPLETED | OUTPATIENT
Start: 2020-10-21 | End: 2020-10-21

## 2020-10-21 RX ORDER — SODIUM CHLORIDE, SODIUM LACTATE, POTASSIUM CHLORIDE, CALCIUM CHLORIDE 600; 310; 30; 20 MG/100ML; MG/100ML; MG/100ML; MG/100ML
INJECTION, SOLUTION INTRAVENOUS CONTINUOUS
Status: DISCONTINUED | OUTPATIENT
Start: 2020-10-21 | End: 2020-10-21 | Stop reason: HOSPADM

## 2020-10-21 RX ORDER — LABETALOL 20 MG/4 ML (5 MG/ML) INTRAVENOUS SYRINGE
5 EVERY 10 MIN PRN
Status: DISCONTINUED | OUTPATIENT
Start: 2020-10-21 | End: 2020-10-21 | Stop reason: HOSPADM

## 2020-10-21 RX ORDER — FENTANYL CITRATE 50 UG/ML
25 INJECTION, SOLUTION INTRAMUSCULAR; INTRAVENOUS EVERY 5 MIN PRN
Status: DISCONTINUED | OUTPATIENT
Start: 2020-10-21 | End: 2020-10-21 | Stop reason: HOSPADM

## 2020-10-21 RX ORDER — LIDOCAINE HYDROCHLORIDE 10 MG/ML
INJECTION, SOLUTION INFILTRATION; PERINEURAL PRN
Status: DISCONTINUED | OUTPATIENT
Start: 2020-10-21 | End: 2020-10-21 | Stop reason: ALTCHOICE

## 2020-10-21 RX ORDER — PROPOFOL 10 MG/ML
INJECTION, EMULSION INTRAVENOUS PRN
Status: DISCONTINUED | OUTPATIENT
Start: 2020-10-21 | End: 2020-10-21 | Stop reason: SDUPTHER

## 2020-10-21 RX ORDER — LIDOCAINE HYDROCHLORIDE 20 MG/ML
INJECTION, SOLUTION INTRAVENOUS PRN
Status: DISCONTINUED | OUTPATIENT
Start: 2020-10-21 | End: 2020-10-21 | Stop reason: SDUPTHER

## 2020-10-21 RX ORDER — ENALAPRILAT 2.5 MG/2ML
1.25 INJECTION INTRAVENOUS
Status: DISCONTINUED | OUTPATIENT
Start: 2020-10-21 | End: 2020-10-21 | Stop reason: HOSPADM

## 2020-10-21 RX ORDER — MAGNESIUM HYDROXIDE 1200 MG/15ML
LIQUID ORAL CONTINUOUS PRN
Status: COMPLETED | OUTPATIENT
Start: 2020-10-21 | End: 2020-10-21

## 2020-10-21 RX ORDER — HEPARIN SODIUM (PORCINE) LOCK FLUSH IV SOLN 100 UNIT/ML 100 UNIT/ML
SOLUTION INTRAVENOUS PRN
Status: DISCONTINUED | OUTPATIENT
Start: 2020-10-21 | End: 2020-10-21 | Stop reason: ALTCHOICE

## 2020-10-21 RX ORDER — CEFAZOLIN SODIUM 2 G/50ML
2 SOLUTION INTRAVENOUS ONCE
Status: COMPLETED | OUTPATIENT
Start: 2020-10-21 | End: 2020-10-21

## 2020-10-21 RX ORDER — ONDANSETRON 2 MG/ML
INJECTION INTRAMUSCULAR; INTRAVENOUS PRN
Status: DISCONTINUED | OUTPATIENT
Start: 2020-10-21 | End: 2020-10-21 | Stop reason: SDUPTHER

## 2020-10-21 RX ORDER — SODIUM CHLORIDE 9 MG/ML
INJECTION, SOLUTION INTRAVENOUS CONTINUOUS
Status: DISCONTINUED | OUTPATIENT
Start: 2020-10-21 | End: 2020-10-21 | Stop reason: HOSPADM

## 2020-10-21 RX ADMIN — PROPOFOL 130 MCG/KG/MIN: 10 INJECTION, EMULSION INTRAVENOUS at 09:01

## 2020-10-21 RX ADMIN — ONDANSETRON 4 MG: 2 INJECTION INTRAMUSCULAR; INTRAVENOUS at 09:22

## 2020-10-21 RX ADMIN — PROPOFOL 50 MG: 10 INJECTION, EMULSION INTRAVENOUS at 09:01

## 2020-10-21 RX ADMIN — FENTANYL CITRATE 50 MCG: 50 INJECTION INTRAMUSCULAR; INTRAVENOUS at 09:06

## 2020-10-21 RX ADMIN — SODIUM CHLORIDE, SODIUM LACTATE, POTASSIUM CHLORIDE, AND CALCIUM CHLORIDE: 600; 310; 30; 20 INJECTION, SOLUTION INTRAVENOUS at 08:39

## 2020-10-21 RX ADMIN — MIDAZOLAM HYDROCHLORIDE 2 MG: 2 INJECTION, SOLUTION INTRAMUSCULAR; INTRAVENOUS at 08:55

## 2020-10-21 RX ADMIN — FENTANYL CITRATE 50 MCG: 50 INJECTION INTRAMUSCULAR; INTRAVENOUS at 09:02

## 2020-10-21 RX ADMIN — SODIUM CHLORIDE, POTASSIUM CHLORIDE, SODIUM LACTATE AND CALCIUM CHLORIDE: 600; 310; 30; 20 INJECTION, SOLUTION INTRAVENOUS at 07:43

## 2020-10-21 RX ADMIN — LIDOCAINE HYDROCHLORIDE 50 MG: 20 INJECTION, SOLUTION INTRAVENOUS at 09:01

## 2020-10-21 RX ADMIN — ONDANSETRON 4 MG: 2 INJECTION INTRAMUSCULAR; INTRAVENOUS at 10:27

## 2020-10-21 RX ADMIN — CEFAZOLIN SODIUM 2 G: 2 SOLUTION INTRAVENOUS at 09:02

## 2020-10-21 ASSESSMENT — PULMONARY FUNCTION TESTS
PIF_VALUE: 1
PIF_VALUE: 0
PIF_VALUE: 1
PIF_VALUE: 0
PIF_VALUE: 1
PIF_VALUE: 0
PIF_VALUE: 1
PIF_VALUE: 0
PIF_VALUE: 1

## 2020-10-21 ASSESSMENT — PAIN DESCRIPTION - FREQUENCY: FREQUENCY: INTERMITTENT

## 2020-10-21 ASSESSMENT — PAIN DESCRIPTION - ORIENTATION: ORIENTATION: LEFT

## 2020-10-21 ASSESSMENT — PAIN DESCRIPTION - ONSET: ONSET: ON-GOING

## 2020-10-21 ASSESSMENT — PAIN SCALES - GENERAL: PAINLEVEL_OUTOF10: 4

## 2020-10-21 ASSESSMENT — PAIN DESCRIPTION - PROGRESSION: CLINICAL_PROGRESSION: NOT CHANGED

## 2020-10-21 ASSESSMENT — PAIN - FUNCTIONAL ASSESSMENT: PAIN_FUNCTIONAL_ASSESSMENT: 0-10

## 2020-10-21 ASSESSMENT — PAIN DESCRIPTION - DESCRIPTORS: DESCRIPTORS: ACHING;THROBBING

## 2020-10-21 ASSESSMENT — PAIN DESCRIPTION - LOCATION: LOCATION: SHOULDER

## 2020-10-21 ASSESSMENT — PAIN DESCRIPTION - PAIN TYPE: TYPE: ACUTE PAIN

## 2020-10-21 NOTE — H&P
The patient was counseled at length about the risks of pat Covid-19 during their perioperative period and any recovery window from their procedure. The patient was made aware that pat Covid-19  may worsen their prognosis for recovering from their procedure  and lend to a higher morbidity and/or mortality risk. All material risks, benefits, and reasonable alternatives including postponing the procedure were discussed. The patient does  wish to proceed with the procedure at this time. Date of Surgery Update:  Louvenia Alpers was seen, history and physical examination reviewed, and patient examined by me today. There have been no significant clinical changes since the completion of the previous history and physical.    The risk, benefits, and alternatives of the proposed procedure have been explained to the patient (or appropriate guardian) and understanding verbalized. All questions answered. Patient wishes to proceed.     Electronically signed by: Isac Wagoner MD,10/21/2020,8:57 AM

## 2020-10-21 NOTE — H&P
Brianna Louie    [id number]    [facility] Same Day Surgery Update H & P  Department of General Surgery   Surgical Service   Pre-operative History and Physical  Last H & P within the last 30 days. DIAGNOSIS:   BREAST CANCER C50.919, VENOUS INSUFFICIENCY I99.8    Procedure(s):  PORT-A-CATHETER INSERTION     HISTORY OF PRESENT ILLNESS:   Patient with breast cancer presents today for port placement in anticipation of planned treatment. Covid 19:  Patient denies fever, chills, cough or known exposure to Covid-19.   Patient reports they have not been quarantined at home since Covid-19 test. Patient states she was not told to quarantine by anyone     Past Medical History:        Diagnosis Date    Allergic rhinitis     BCC (basal cell carcinoma of skin)     Breast cancer [facility])      Past Surgical History:        Procedure Laterality Date    BREAST SURGERY Left 2016    lumpectomy    MASTECTOMY Left 2016    MASTECTOMY      left    MOHS SURGERY      TONSILLECTOMY      US GUIDED LIVER BIOPSY PERCUTANEOUS  2020    US GUIDED LIVER BIOPSY PERCUTANEOUS 2020 [address] SCAN     Past Social History:  Social History     Socioeconomic History    Marital status:      Spouse name: None    Number of children: None    Years of education: None    Highest education level: None   Occupational History    Occupation: unemployed   Social Needs    Financial resource strain: None    Food insecurity     Worry: None     Inability: None    Transportation needs     Medical: None     Non-medical: None   Tobacco Use    Smoking status: Former Smoker     Types: Cigarettes     Start date: 1992     Last attempt to quit: 1996     Years since quittin.8    Smokeless tobacco: Never Used   Substance and Sexual Activity    Alcohol use: Yes     Comment: occasional    Drug use: No    Sexual activity: None   Lifestyle    Physical activity     Days per week: None     Minutes per session: None    Stress: None   Relationships    Social connections     Talks on phone: None     Gets together: None     Attends Yazidi service: None     Active member of club or organization: None     Attends meetings of clubs or organizations: None     Relationship status: None    Intimate partner violence     Fear of current or ex partner: None     Emotionally abused: None     Physically abused: None     Forced sexual activity: None   Other Topics Concern    None   Social History Narrative    None         Medications Prior to Admission:      None      Allergies:  Patient has no known allergies. PHYSICAL EXAM:      /77   Pulse 88   Temp 98 °F (36.7 °C) (Oral)   Resp 16   Ht 5' 5\" (1.651 m)   Wt 142 lb (64.4 kg)   LMP 09/28/2020 (Approximate)   SpO2 96%   BMI 23.63 kg/m²      Airway:  Airway patent with no audible stridor    Heart:  regular rate and rhythm, No murmur noted    Lungs:  No increased work of breathing, good air exchange, clear to auscultation bilaterally, no crackles or wheezing    Abdomen:  soft, non-distended, non-tender, no rebound tenderness or guarding, normal active bowel sounds and no masses palpated    ASSESSMENT AND PLAN     Patient is a 54 y.o. female with above specified procedure planned. 1.  Patient seen and focused exam done today- no new changes since last physical exam on 10/5/20    2. Access to ancillary services are available per request of the provider.     JESSICA Vogel - CNP     10/21/2020

## 2020-10-21 NOTE — BRIEF OP NOTE
Brief Postoperative Note    Name           : Celine Mckenna  YOB: 1975  MRN             : [id number]    Pre-operative Diagnosis: 1. Metastatic breast cancer  2. Poor iv access  3. Skin lesion right chest    Post-operative Diagnosis: Same    Procedure: 1. Right subclavian port placement (6f power port) with fluoroscopic guiduaance    Anesthesia: MAC    Surgeons/Assistants: Acosta    Estimated Blood Loss: less than 50     Complications: None    Specimens: Was Obtained: 1.  Skin lesion right chest    Findings: same as post op    Electronically signed by Nanette Martínez MD on 10/21/2020 at 9:31 AM

## 2020-10-21 NOTE — PROGRESS NOTES
Ambulatory Surgery/Procedure Discharge Note    Vitals:    10/21/20 1104   BP: 108/72   Pulse: 64   Resp: 14   Temp: 98.5 °F (36.9 °C)   SpO2: 97%       In: 341 [P.O.:265; I.V.:76]  Out: -     Restroom use offered before discharge. Yes    Pain assessment:  level of pain (1-10, 10 severe),   Pain Level: 4(Left shoulder posteriorly)    Drsg to the Left chest wall dry and intact with ice pack. Pt tolerates po well and DC instructions given to pt and S. O. with verbalization of understanding of pt and S. O. with both pt and S.O. states 'ready to go home'. Patient discharged to home/self care.  Patient discharged via wheel chair by transporter to waiting family/S.O.       10/21/2020 12:09 PM

## 2020-10-21 NOTE — PROGRESS NOTES
PACU Transfer to [facility]  Pt's Current Allergies: Patient has no known allergies. Pt meets criteria to transfer to next phase of care per Alda Obando and BECKY standards    No results for input(s): POCGLU in the last 72 hours. Vitals:    10/21/20 1030   BP: 106/81   Pulse: 74   Resp: 17   Temp: 97.8 °F (36.6 °C)   SpO2: 100%      BP within 20% of pt's admitting BP as per Anton Score      Intake/Output Summary (Last 24 hours) at 10/21/2020 1044  Last data filed at 10/21/2020 1037  Gross per 24 hour   Intake 101 ml   Output --   Net 101 ml         Pain assessment:  none  Pain Level: (states pain is tolerable)      Patient transferred to care of [facility] RN.   Family updated and directed to [facility]    10/21/2020 10:44 AM

## 2020-10-21 NOTE — ANESTHESIA POSTPROCEDURE EVALUATION
Department of Anesthesiology  Postprocedure Note    Patient: Mel Valentin  MRN: [id number]  YOB: 1975  Date of evaluation: 10/21/2020  Time:  11:11 AM     Procedure Summary     Date:  10/21/20 Room / Location:  [facility] OR [address] [place] [address]    Anesthesia Start:  2848 Anesthesia Stop:      Procedure:  PORT-A-CATHETER INSERTION RIGHT SUBCLAVIAN (N/A ) Diagnosis:  (BREAST CANCER C50.919, VENOUS INSUFFICIENCY I99.8)    Surgeon:  Tristen Becker MD Responsible Provider:  Annelise Fish MD    Anesthesia Type:  general ASA Status:  3          Anesthesia Type: general    Anton Phase I: Anton Score: 10    Anton Phase II:      Last vitals: Reviewed and per EMR flowsheets.        Anesthesia Post Evaluation    Patient participation: complete - patient participated  Level of consciousness: awake  Airway patency: patent  Nausea & Vomiting: no nausea and no vomiting  Complications: no  Cardiovascular status: hemodynamically stable  Respiratory status: acceptable  Hydration status: stable

## 2020-10-21 NOTE — ANESTHESIA PRE PROCEDURE
2016    lumpectomy    MASTECTOMY Left 2016    MASTECTOMY      left    MOHS SURGERY      TONSILLECTOMY      US GUIDED LIVER BIOPSY PERCUTANEOUS  2020    US GUIDED LIVER BIOPSY PERCUTANEOUS 2020 [address] SCAN       Social History:    Social History     Tobacco Use    Smoking status: Former Smoker     Types: Cigarettes     Start date: 1992     Last attempt to quit: 1996     Years since quittin.8    Smokeless tobacco: Never Used   Substance Use Topics    Alcohol use: Yes     Comment: occasional                                Counseling given: Not Answered      Vital Signs (Current):   Vitals:    10/19/20 1157 10/21/20 0709   BP:  107/77   Pulse:  88   Resp:  16   Temp:  98 °F (36.7 °C)   TempSrc:  Oral   SpO2:  96%   Weight: 142 lb (64.4 kg) 142 lb (64.4 kg)   Height: 5' 5\" (1.651 m) 5' 5\" (1.651 m)                                              BP Readings from Last 3 Encounters:   10/21/20 107/77   20 108/74   20 110/70       NPO Status: Time of last liquid consumption:                         Time of last solid consumption:                         Date of last liquid consumption: 10/20/20                        Date of last solid food consumption: 10/20/20    BMI:   Wt Readings from Last 3 Encounters:   10/21/20 142 lb (64.4 kg)   20 152 lb (68.9 kg)   20 151 lb 3.2 oz (68.6 kg)     Body mass index is 23.63 kg/m².     CBC:   Lab Results   Component Value Date    WBC 7.7 2019    RBC 4.15 2019    RBC 3.74 06/15/2017    HGB 12.6 2019    HCT 38.1 2019    MCV 92.0 2019    RDW 13.1 2019     2019       CMP:   Lab Results   Component Value Date     2019    K 4.7 2019     2019    CO2 28 2019    BUN 11 2019    CREATININE 0.6 2019    GFRAA >60 2019    AGRATIO 1.8 2019    LABGLOM >60 2019    GLUCOSE 93 2019    GLUCOSE 103 06/15/2017    PROT 7.0 03/07/2019    PROT 6.8 06/15/2017    CALCIUM 9.2 03/07/2019    BILITOT <0.2 03/07/2019    ALKPHOS 68 03/07/2019    AST 13 03/07/2019    ALT 9 03/07/2019       POC Tests: No results for input(s): POCGLU, POCNA, POCK, POCCL, POCBUN, POCHEMO, POCHCT in the last 72 hours. Coags:   Lab Results   Component Value Date    PROTIME 11.3 09/23/2020    INR 0.97 09/23/2020       HCG (If Applicable):   Lab Results   Component Value Date    PREGTESTUR Negative 10/21/2020        ABGs: No results found for: PHART, PO2ART, NGX4RCE, HIR6FES, BEART, F2RLIITT     Type & Screen (If Applicable):  No results found for: LABABO, LABRH    Drug/Infectious Status (If Applicable):  No results found for: HIV, HEPCAB    COVID-19 Screening (If Applicable):   Lab Results   Component Value Date    COVID19 NOT DETECTED 10/15/2020         Anesthesia Evaluation  Patient summary reviewed and Nursing notes reviewed no history of anesthetic complications:   Airway: Mallampati: I  TM distance: >3 FB   Neck ROM: full  Mouth opening: > = 3 FB Dental: normal exam         Pulmonary:Negative Pulmonary ROS                              Cardiovascular:Negative CV ROS                      Neuro/Psych:   Negative Neuro/Psych ROS              GI/Hepatic/Renal: Neg GI/Hepatic/Renal ROS            Endo/Other:                      ROS comment: Breast Ca Abdominal:           Vascular: negative vascular ROS. Anesthesia Plan      general     ASA 3       Induction: intravenous. Anesthetic plan and risks discussed with patient.                       Ren Newton MD   10/21/2020

## 2020-10-23 ENCOUNTER — HOSPITAL ENCOUNTER (EMERGENCY)
Age: 45
Discharge: HOME | End: 2020-10-24
Attending: EMERGENCY MEDICINE
Payer: COMMERCIAL

## 2020-10-23 ENCOUNTER — APPOINTMENT (OUTPATIENT)
Dept: GENERAL RADIOLOGY | Age: 45
End: 2020-10-23
Payer: COMMERCIAL

## 2020-10-23 VITALS
OXYGEN SATURATION: 95 % | BODY MASS INDEX: 23.63 KG/M2 | WEIGHT: 142 LBS | SYSTOLIC BLOOD PRESSURE: 102 MMHG | RESPIRATION RATE: 16 BRPM | HEART RATE: 91 BPM | TEMPERATURE: 98.9 F | DIASTOLIC BLOOD PRESSURE: 66 MMHG

## 2020-10-23 LAB
ANION GAP SERPL CALCULATED.3IONS-SCNC: 10 MMOL/L (ref 3–16)
BASOPHILS ABSOLUTE: 0 K/UL (ref 0–0.2)
BASOPHILS RELATIVE PERCENT: 0.4 %
BILIRUBIN URINE: NEGATIVE
BLOOD, URINE: NEGATIVE
BUN BLDV-MCNC: 11 MG/DL (ref 7–20)
CALCIUM SERPL-MCNC: 8.1 MG/DL (ref 8.3–10.6)
CHLORIDE BLD-SCNC: 101 MMOL/L (ref 99–110)
CLARITY: CLEAR
CO2: 24 MMOL/L (ref 21–32)
COLOR: YELLOW
CREAT SERPL-MCNC: 0.8 MG/DL (ref 0.6–1.1)
EOSINOPHILS ABSOLUTE: 0 K/UL (ref 0–0.6)
EOSINOPHILS RELATIVE PERCENT: 0.4 %
GFR AFRICAN AMERICAN: >60
GFR NON-AFRICAN AMERICAN: >60
GLUCOSE BLD-MCNC: 105 MG/DL (ref 70–99)
GLUCOSE URINE: NEGATIVE MG/DL
HCT VFR BLD CALC: 36.7 % (ref 36–48)
HEMOGLOBIN: 12.1 G/DL (ref 12–16)
KETONES, URINE: NEGATIVE MG/DL
LACTIC ACID: 0.6 MMOL/L (ref 0.4–2)
LEUKOCYTE ESTERASE, URINE: NEGATIVE
LYMPHOCYTES ABSOLUTE: 0.5 K/UL (ref 1–5.1)
LYMPHOCYTES RELATIVE PERCENT: 14 %
MCH RBC QN AUTO: 30 PG (ref 26–34)
MCHC RBC AUTO-ENTMCNC: 33.1 G/DL (ref 31–36)
MCV RBC AUTO: 90.4 FL (ref 80–100)
MICROSCOPIC EXAMINATION: NORMAL
MONOCYTES ABSOLUTE: 0.1 K/UL (ref 0–1.3)
MONOCYTES RELATIVE PERCENT: 2.5 %
NEUTROPHILS ABSOLUTE: 3.2 K/UL (ref 1.7–7.7)
NEUTROPHILS RELATIVE PERCENT: 82.7 %
NITRITE, URINE: NEGATIVE
PDW BLD-RTO: 13.5 % (ref 12.4–15.4)
PH UA: 7 (ref 5–8)
PLATELET # BLD: 196 K/UL (ref 135–450)
PMV BLD AUTO: 10 FL (ref 5–10.5)
POTASSIUM REFLEX MAGNESIUM: 4.2 MMOL/L (ref 3.5–5.1)
PROTEIN UA: NEGATIVE MG/DL
RBC # BLD: 4.06 M/UL (ref 4–5.2)
SODIUM BLD-SCNC: 135 MMOL/L (ref 136–145)
SPECIFIC GRAVITY UA: 1.01 (ref 1–1.03)
URINE REFLEX TO CULTURE: NORMAL
URINE TYPE: NORMAL
UROBILINOGEN, URINE: 0.2 E.U./DL
WBC # BLD: 3.8 K/UL (ref 4–11)

## 2020-10-23 PROCEDURE — 80048 BASIC METABOLIC PNL TOTAL CA: CPT

## 2020-10-23 PROCEDURE — 71045 X-RAY EXAM CHEST 1 VIEW: CPT

## 2020-10-23 PROCEDURE — 99283 EMERGENCY DEPT VISIT LOW MDM: CPT

## 2020-10-23 PROCEDURE — 2580000003 HC RX 258

## 2020-10-23 PROCEDURE — 87040 BLOOD CULTURE FOR BACTERIA: CPT

## 2020-10-23 PROCEDURE — 2580000003 HC RX 258: Performed by: PHYSICIAN ASSISTANT

## 2020-10-23 PROCEDURE — 83605 ASSAY OF LACTIC ACID: CPT

## 2020-10-23 PROCEDURE — 6360000002 HC RX W HCPCS: Performed by: PHYSICIAN ASSISTANT

## 2020-10-23 PROCEDURE — 85025 COMPLETE CBC W/AUTO DIFF WBC: CPT

## 2020-10-23 PROCEDURE — 96365 THER/PROPH/DIAG IV INF INIT: CPT

## 2020-10-23 PROCEDURE — 81003 URINALYSIS AUTO W/O SCOPE: CPT

## 2020-10-23 RX ORDER — ONDANSETRON 4 MG/1
4 TABLET, ORALLY DISINTEGRATING ORAL EVERY 8 HOURS PRN
COMMUNITY

## 2020-10-23 RX ORDER — SODIUM CHLORIDE 9 MG/ML
INJECTION, SOLUTION INTRAVENOUS
Status: COMPLETED
Start: 2020-10-23 | End: 2020-10-23

## 2020-10-23 RX ORDER — 0.9 % SODIUM CHLORIDE 0.9 %
30 INTRAVENOUS SOLUTION INTRAVENOUS ONCE
Status: COMPLETED | OUTPATIENT
Start: 2020-10-23 | End: 2020-10-23

## 2020-10-23 RX ORDER — LEVOFLOXACIN 750 MG/1
750 TABLET ORAL DAILY
Qty: 7 TABLET | Refills: 0 | Status: SHIPPED | OUTPATIENT
Start: 2020-10-23 | End: 2020-10-30

## 2020-10-23 RX ADMIN — SODIUM CHLORIDE 1932 ML: 9 INJECTION, SOLUTION INTRAVENOUS at 21:37

## 2020-10-23 RX ADMIN — CEFEPIME HYDROCHLORIDE 1 G: 1 INJECTION, POWDER, FOR SOLUTION INTRAMUSCULAR; INTRAVENOUS at 21:19

## 2020-10-23 RX ADMIN — Medication 1932 ML: at 21:37

## 2020-10-23 NOTE — OP NOTE
[facility] [address], [address]                                OPERATIVE REPORT    PATIENT NAME: Annette ACEVEDO                      :        1975  MED REC NO:   [id number]                          ROOM:  ACCOUNT NO:   [de-identified]                           ADMIT DATE: 10/21/2020  PROVIDER:     Alcira Wu MD    DATE OF PROCEDURE:  10/21/2020    PREOPERATIVE DIAGNOSES:  1. Metastatic breast cancer. 2.  Poor IV access. 3. Skin lesion right chest    POSTOPERATIVE DIAGNOSES:  1. Metastatic breast cancer. 2.  Poor IV access. 3. Skin lesion right chest    OPERATIONS PERFORMED:  1. Right subclavian Port-A-Cath placement (6-German PowerPort) with  fluoroscopic guidance. 2.  Excision of skin lesion, right chest.    ATTENDING SURGEON:  Alcira Wu MD    ESTIMATED BLOOD LOSS:  Less than 50 mL. ANESTHESIA:  MAC plus 20 mL of 1% Xylocaine. SPECIMENS:  Skin lesion, right chest. (5mm)    INDICATIONS FOR PROCEDURE:  The patient is a 79-year-old female who was  recently diagnosed with a metastatic breast cancer. She has extensive  liver metastases and is scheduled to begin chemotherapy tomorrow. She  needs Port-A-Cath for treatments. DESCRIPTION OF PROCEDURE:  The patient was brought to the operating  room, placed on the OR table in the supine position. A rolled towel was  placed behind her shoulders and arms were tucked to her side. Following  the administration of IV sedation, the patient's neck and chest were  prepped with Betadine and draped in the usual sterile manner. 1%  Xylocaine without epinephrine was injected along the median third of the  right clavicle. An 18-gauge needle was used to cannulate the right  subclavian vein on the first attempt. A wire was passed into the  central circulation. Its position was confirmed under fluoroscopic  guidance.       An incision was made in the upper medial aspect of the right chest.  The noted skin lesion was excised while making this skin incision. The skin was submitted to pathology. A pocket was created inferior and medial to the needle  insertion site. The wire was brought through the pocket. A sheath and  dilator were passed over the wire, and the wire and dilator were  removed. The catheter was placed through the sheath and the sheath was  removed. The tip of the catheter was positioned in the distal superior  vena cava under fluoroscopic guidance. The catheter was cut to the  appropriate length and attached to the port device and locked into  place. The port was cannulated with a Davis needle and aspirated and  flushed easily. The port was placed in the pocket and anchored to the  chest wall using 3-0 Prolene suture. The subcutaneous tissue was closed  with interrupted 4-0 Vicryl suture and the skin was closed with 4-0  Vicryl subcuticular stitch. The port was accessed with [facility] needle and  left accessed for treatments. Benzoin, Steri's, dry sterile gauze, and  Tegaderm dressing were applied. All needle and sponge counts were  correct. The patient was returned to Recovery in good condition. I was  present for the entire procedure. A postprocedure chest x-ray was  Obtained.             Dee Velazquez MD    D: 10/22/2020 18:30:57       T: 10/22/2020 22:38:25     [email or web address]  Job#: [id number]     Doc#: [id number]    CC:  Rico Savage MD       [address] [place] [address]

## 2020-10-24 ASSESSMENT — ENCOUNTER SYMPTOMS
VOMITING: 0
CHEST TIGHTNESS: 0
SHORTNESS OF BREATH: 0
ABDOMINAL PAIN: 0
DIARRHEA: 0
BACK PAIN: 0
NAUSEA: 0
SINUS PRESSURE: 0

## 2020-10-24 NOTE — ED TRIAGE NOTES
Pt comes to ED with complaints of fever - 101.9 at about 7:30p -- [facility] pt -- MD stated to come to ED. Pt stated she took Extra Strength Tylenol and thinks fever broke on way to ED.

## 2020-10-24 NOTE — ED NOTES
Speckled top, White/Yellow and Grey top urine tubes collected and sent to lab       PA Acevedo  10/23/20 DANDRE/ Lucero Amaral RN  10/23/20 2468

## 2020-10-24 NOTE — ED NOTES
Patient back to room, in bed with side rails up and wheels locked.       Pam Kamara RN  10/23/20 0048

## 2020-10-24 NOTE — ED PROVIDER NOTES
includes Tonsillectomy; Mohs surgery; Breast surgery (Left, 06/2016); Mastectomy (Left, 08/22/2016); Mastectomy; US BIOPSY LIVER PERCUTANEOUS (9/23/2020); and Port Surgery (N/A, 10/21/2020). Her family history includes Cancer in her father; Diabetes in her father; Heart Disease (age of onset: 36) in her father; High Cholesterol in her father. She reports that she quit smoking about 24 years ago. Her smoking use included cigarettes. She started smoking about 28 years ago. She has never used smokeless tobacco. She reports current alcohol use. She reports that she does not use drugs. Medications     Discharge Medication List as of 10/23/2020 11:55 PM      CONTINUE these medications which have NOT CHANGED    Details   ondansetron (ZOFRAN-ODT) 4 MG disintegrating tablet Take 4 mg by mouth every 8 hours as needed for Nausea or VomitingHistorical Med      lidocaine-prilocaine (EMLA) 2.5-2.5 % cream Apply to port site one hour prior to treatment, cover with saran wrap or bandaid, Disp-1 Tube,R-10, Print           Allergies     She has No Known Allergies. Physical Exam     INITIAL VITALS: BP: (!) 90/59, Temp: 98.9 °F (37.2 °C), Pulse: 109, Resp: 16, SpO2: 96 %  Physical Exam    Diagnostic Results     RADIOLOGY:  XR CHEST PORTABLE   Final Result      No acute chest process.         LABS:   Results for orders placed or performed during the hospital encounter of 10/23/20   CBC Auto Differential   Result Value Ref Range    WBC 3.8 (L) 4.0 - 11.0 K/uL    RBC 4.06 4.00 - 5.20 M/uL    Hemoglobin 12.1 12.0 - 16.0 g/dL    Hematocrit 36.7 36.0 - 48.0 %    MCV 90.4 80.0 - 100.0 fL    MCH 30.0 26.0 - 34.0 pg    MCHC 33.1 31.0 - 36.0 g/dL    RDW 13.5 12.4 - 15.4 %    Platelets 877 537 - 496 K/uL    MPV 10.0 5.0 - 10.5 fL    Neutrophils % 82.7 %    Lymphocytes % 14.0 %    Monocytes % 2.5 %    Eosinophils % 0.4 %    Basophils % 0.4 %    Neutrophils Absolute 3.2 1.7 - 7.7 K/uL    Lymphocytes Absolute 0.5 (L) 1.0 - 5.1 K/uL Monocytes Absolute 0.1 0.0 - 1.3 K/uL    Eosinophils Absolute 0.0 0.0 - 0.6 K/uL    Basophils Absolute 0.0 0.0 - 0.2 K/uL   Basic Metabolic Panel w/ Reflex to MG   Result Value Ref Range    Sodium 135 (L) 136 - 145 mmol/L    Potassium reflex Magnesium 4.2 3.5 - 5.1 mmol/L    Chloride 101 99 - 110 mmol/L    CO2 24 21 - 32 mmol/L    Anion Gap 10 3 - 16    Glucose 105 (H) 70 - 99 mg/dL    BUN 11 7 - 20 mg/dL    CREATININE 0.8 0.6 - 1.1 mg/dL    GFR Non-African American >60 >60    GFR African American >60 >60    Calcium 8.1 (L) 8.3 - 10.6 mg/dL   Lactic Acid, Plasma   Result Value Ref Range    Lactic Acid 0.6 0.4 - 2.0 mmol/L   Urinalysis Reflex to Culture    Specimen: Urine, clean catch   Result Value Ref Range    Color, UA Yellow Straw/Yellow    Clarity, UA Clear Clear    Glucose, Ur Negative Negative mg/dL    Bilirubin Urine Negative Negative    Ketones, Urine Negative Negative mg/dL    Specific Gravity, UA 1.010 1.005 - 1.030    Blood, Urine Negative Negative    pH, UA 7.0 5.0 - 8.0    Protein, UA Negative Negative mg/dL    Urobilinogen, Urine 0.2 <2.0 E.U./dL    Nitrite, Urine Negative Negative    Leukocyte Esterase, Urine Negative Negative    Microscopic Examination Not Indicated     Urine Type Voided     Urine Reflex to Culture Not Indicated        ED BEDSIDE ULTRASOUND:  None    RECENT VITALS:  BP: 102/66, Temp: 98.9 °F (37.2 °C), Pulse: 91, Resp: 16, SpO2: 95 %     Procedures   None    ED Course     Nursing Notes, Past Medical Hx,Past Surgical Hx, Social Hx, Allergies, and Family Hx were reviewed.     The patient was given the following medications:  Orders Placed This Encounter   Medications    cefepime (MAXIPIME) 1 g IVPB minibag    0.9 % sodium chloride IV bolus 1,932 mL    sodium chloride 0.9 % infusion     WINNIE PEÑA: cabinet override    levoFLOXacin (LEVAQUIN) 750 MG tablet     Sig: Take 1 tablet by mouth daily for 7 days     Dispense:  7 tablet     Refill:  0       CONSULTS:  IP CONSULT TO follow-up instructions. This patient was also evaluated by the attending physician. All care plans were discussed and agreed upon. Clinical Impression     1. Fever, unspecified fever cause        Disposition     PATIENT REFERRED TO:  No follow-up provider specified.     DISCHARGE MEDICATIONS:  Discharge Medication List as of 10/23/2020 11:55 PM      START taking these medications    Details   levoFLOXacin (LEVAQUIN) 750 MG tablet Take 1 tablet by mouth daily for 7 days, Disp-7 tablet,R-0Print             DISPOSITION Decision To Discharge 10/23/2020 11:56:11 PM       Amauri Mendoza PA-C  10/24/20 [id number]

## 2020-10-24 NOTE — ED NOTES
Patient discharged to home with family. Patient verbalized understanding of discharge instructions. Advised of when to return to ED and when to call 911. Advised of follow up with PCP. Patient received 1 Rx with education. Patient verbalized understanding of education. No questions from patient to this RN. Patient left ED without incident.       Adriane Tong RN  10/24/20 0002

## 2020-10-24 NOTE — ED PROVIDER NOTES
ED Attending Attestation Note     Date of evaluation: 10/23/2020    This patient was seen by the advance practice provider. I have seen and examined the patient, agree with the workup, evaluation, management and diagnosis. The care plan has been discussed. I have reviewed the ECG and concur with the TOMASZ's interpretation. My assessment reveals a 41-year-old female presenting to the emergency department with a complaint of fever to 101.9 earlier today. She has a history of breast cancer just started on chemotherapy yesterday. On examination abdomen soft and nontender her right upper chest port site appears to be well-healing.      Rayshawn Don MD  10/23/20 6319

## 2020-10-24 NOTE — ED NOTES
Patient ambulated to hallway bathroom to give a urine sample. Assisted by PCA Alix Morin.      Jennifer Swann RN  10/23/20 3545

## 2020-10-26 ENCOUNTER — CARE COORDINATION (OUTPATIENT)
Dept: CARE COORDINATION | Age: 45
End: 2020-10-26

## 2020-10-26 NOTE — CARE COORDINATION
Patient contacted regarding Ramesh Pearl. Discussed COVID-19 related testing which was pending initiating test at this time. Test results were pending initiating test at this time. Patient informed of results, if available? N/A  Pt shares she elected not to test for Flu or Covid at this time, preferring first to review w/care team @ [address] [place] [address] Transition Nurse/ Ambulatory Care Manager contacted the patient by telephone to perform post discharge assessment. Call within 2 business days of discharge: Yes. Verified name and  with patient as identifiers. Provided introduction to self, and explanation of the CTN/ACM role, and reason for call due to risk factors for infection and/or exposure to COVID-19. Symptoms reviewed with patient who verbalized the following symptoms: no new symptoms and no worsening symptoms. Due to no new or worsening symptoms encounter was not routed to provider for escalation. Discussed follow-up appointments. If no appointment was previously scheduled, appointment scheduling offered: Yes; declines need for assistance w/[facility] follow up appointment(s): No future appointments. Non-[facility] follow up appointment(s): pt following w/[facility]; states she had just spoke w/[facility] prior to this [address] phone outreach. Non-face-to-face services provided:  Obtained and reviewed discharge summary and/or continuity of care documents     Advance Care Planning:   Does patient have an Advance Directive:  not on file; education provided. Patient has following risk factors of: immunocompromised. CTN/ACM reviewed discharge instructions, medical action plan and red flags such as increased shortness of breath, increasing fever and signs of decompensation with patient who verbalized understanding. Discussed exposure protocols and quarantine with CDC Guidelines What to do if you are sick with coronavirus disease 2019.  Patient was given an opportunity for questions and concerns.  The patient agrees to contact the [facility] exposure line [phone], local [facility] department [place] [facility]: ([phone]) and PCP office for questions related to their healthcare. CTN/ACM provided contact information for future needs. Reviewed and educated patient on any new and changed medications related to discharge diagnosis     Patient/family/caregiver given information for [facility] and agrees to enroll no  Patient's preferred e-mail:   Patient's preferred phone number:   Based on Loop alert triggers, patient will be contacted by nurse care manager for worsening symptoms. Pt verbalized understanding of above and agreement with the following  Plan:  Per pt preference - pt will continue to follow w/[facility] and PCP and all providers involved in her care. Pt declines need/want for ongoing outreach by Care Transition nurse for the current Care Transition episode of care. Care Transition episode concluded.

## 2020-10-27 LAB
BLOOD CULTURE, ROUTINE: NORMAL
CULTURE, BLOOD 2: NORMAL

## 2021-01-06 ENCOUNTER — HOSPITAL ENCOUNTER (OUTPATIENT)
Dept: CT IMAGING | Age: 46
Discharge: HOME | End: 2021-01-06
Payer: COMMERCIAL

## 2021-01-06 DIAGNOSIS — C50.312 MALIGNANT NEOPLASM OF LOWER-INNER QUADRANT OF LEFT FEMALE BREAST, UNSPECIFIED ESTROGEN RECEPTOR STATUS (HCC): ICD-10-CM

## 2021-01-06 PROCEDURE — 74177 CT ABD & PELVIS W/CONTRAST: CPT

## 2021-01-06 PROCEDURE — 6360000004 HC RX CONTRAST MEDICATION: Performed by: NURSE PRACTITIONER

## 2021-01-06 RX ADMIN — IOPAMIDOL 80 ML: 755 INJECTION, SOLUTION INTRAVENOUS at 12:28

## 2021-01-06 RX ADMIN — IOHEXOL 50 ML: 240 INJECTION, SOLUTION INTRATHECAL; INTRAVASCULAR; INTRAVENOUS; ORAL at 12:29

## 2021-04-12 ENCOUNTER — HOSPITAL ENCOUNTER (OUTPATIENT)
Dept: NUCLEAR MEDICINE | Age: 46
Discharge: HOME | End: 2021-04-12
Payer: COMMERCIAL

## 2021-04-12 ENCOUNTER — HOSPITAL ENCOUNTER (OUTPATIENT)
Dept: CT IMAGING | Age: 46
Discharge: HOME | End: 2021-04-12
Payer: COMMERCIAL

## 2021-04-12 ENCOUNTER — HOSPITAL ENCOUNTER (OUTPATIENT)
Dept: NON INVASIVE DIAGNOSTICS | Age: 46
Discharge: HOME | End: 2021-04-12
Payer: COMMERCIAL

## 2021-04-12 DIAGNOSIS — C50.312 MALIGNANT NEOPLASM OF LOWER-INNER QUADRANT OF LEFT FEMALE BREAST, UNSPECIFIED ESTROGEN RECEPTOR STATUS (HCC): ICD-10-CM

## 2021-04-12 DIAGNOSIS — Z51.11 ENCOUNTER FOR ANTINEOPLASTIC CHEMOTHERAPY: ICD-10-CM

## 2021-04-12 LAB
LV EF: 58 %
LVEF MODALITY: NORMAL

## 2021-04-12 PROCEDURE — 6360000004 HC RX CONTRAST MEDICATION: Performed by: INTERNAL MEDICINE

## 2021-04-12 PROCEDURE — 78306 BONE IMAGING WHOLE BODY: CPT

## 2021-04-12 PROCEDURE — A9503 TC99M MEDRONATE: HCPCS | Performed by: INTERNAL MEDICINE

## 2021-04-12 PROCEDURE — 74177 CT ABD & PELVIS W/CONTRAST: CPT

## 2021-04-12 PROCEDURE — 93356 MYOCRD STRAIN IMG SPCKL TRCK: CPT

## 2021-04-12 PROCEDURE — 93306 TTE W/DOPPLER COMPLETE: CPT

## 2021-04-12 PROCEDURE — 3430000000 HC RX DIAGNOSTIC RADIOPHARMACEUTICAL: Performed by: INTERNAL MEDICINE

## 2021-04-12 RX ORDER — TC 99M MEDRONATE 20 MG/10ML
26.2 INJECTION, POWDER, LYOPHILIZED, FOR SOLUTION INTRAVENOUS
Status: COMPLETED | OUTPATIENT
Start: 2021-04-12 | End: 2021-04-12

## 2021-04-12 RX ADMIN — IOHEXOL 50 ML: 240 INJECTION, SOLUTION INTRATHECAL; INTRAVASCULAR; INTRAVENOUS; ORAL at 12:38

## 2021-04-12 RX ADMIN — IOPAMIDOL 80 ML: 755 INJECTION, SOLUTION INTRAVENOUS at 12:38

## 2021-04-12 RX ADMIN — TC 99M MEDRONATE 26.2 MILLICURIE: 20 INJECTION, POWDER, LYOPHILIZED, FOR SOLUTION INTRAVENOUS at 11:38

## 2021-12-30 ENCOUNTER — TELEPHONE (OUTPATIENT)
Dept: FAMILY MEDICINE CLINIC | Age: 46
End: 2021-12-30

## 2021-12-30 NOTE — TELEPHONE ENCOUNTER
My apologies! I do not know how I missed that! It looks like she went to her OB/Gyn today for treatment.

## 2021-12-30 NOTE — TELEPHONE ENCOUNTER
I do not see a recent ER visit in [facility] or [facility] Everywhere (was looking for urine culture). She has not seen Dr. Alejandra Hodges since 8/2020. Anyway she can send us records?

## 2021-12-30 NOTE — TELEPHONE ENCOUNTER
Called pt, no answer. Left vm for pt to call back to see if she still needs something for the UTI or to see if her OBGYN took care of this for her today since she seen them.

## 2022-12-07 ENCOUNTER — TELEPHONE (OUTPATIENT)
Dept: FAMILY MEDICINE CLINIC | Age: 47
End: 2022-12-07

## 2022-12-07 NOTE — TELEPHONE ENCOUNTER
Emeli from [facility] called, just wanted to let dr. Jatin Wolfe know pt entered hospice care today

## (undated) DEVICE — SUTURE COAT VCRL SZ 4-0 L18IN ABSRB UD L19MM PS-2 1/2 CIR J496G

## (undated) DEVICE — DRAPE C ARM UNIV W41XL74IN CLR PLAS XR VELC CLSR POLY STRP

## (undated) DEVICE — SPONGE GZ W4XL8IN COT WVN 12 PLY

## (undated) DEVICE — CONTAINER,SPECIMEN,PNEU TUBE,3OZ,OR STRL: Brand: MEDLINE

## (undated) DEVICE — STANDARD HYPODERMIC NEEDLE,POLYPROPYLENE HUB: Brand: MONOJECT

## (undated) DEVICE — GLOVE SURG SZ 7 L11.2IN THK9.8MIL STRW LTX POLYMER BEAD CUF

## (undated) DEVICE — SYRINGE MED 10ML LUERLOCK TIP W/O SFTY DISP

## (undated) DEVICE — SUTURE PROL SZ 3-0 L36IN NONABSORBABLE BLU L26MM SH 1/2 CIR 8522H

## (undated) DEVICE — PLATE ES AD W 9FT CRD 2

## (undated) DEVICE — 3M™ STERI-STRIP™ COMPOUND BENZOIN TINCTURE 40 BAGS/CARTON 4 CARTONS/CASE C1544: Brand: 3M™ STERI-STRIP™

## (undated) DEVICE — SOLUTION IV 1000ML 0.9% SOD CHL

## (undated) DEVICE — TRAY PREP DRY W/ PREM GLV 2 APPL 6 SPNG 2 UNDPD 1 OVERWRAP

## (undated) DEVICE — STRIP,CLOSURE,WOUND,MEDI-STRIP,1/2X4: Brand: MEDLINE

## (undated) DEVICE — COVER LT HNDL BLU PLAS

## (undated) DEVICE — MEDICINE CUP, GRADUATED, STER: Brand: MEDLINE

## (undated) DEVICE — ELECTROSURGICAL PENCIL ROCKER SWITCH NON COATED BLADE ELECTRODE 10 FT (3 M) CORD HOLSTER: Brand: MEGADYNE

## (undated) DEVICE — JEWISH HOSPITAL TURNOVER KIT: Brand: MEDLINE INDUSTRIES, INC.

## (undated) DEVICE — DRAPE,T,LAPARO,TRANS,STERILE: Brand: MEDLINE

## (undated) DEVICE — 40519 ADULT HEAD REST: Brand: 40519 ADULT HEAD REST

## (undated) DEVICE — SURGICAL SET UP - SURE SET: Brand: MEDLINE INDUSTRIES, INC.

## (undated) DEVICE — 3M™ TEGADERM™ TRANSPARENT FILM DRESSING FRAME STYLE, 1626W, 4 IN X 4-3/4 IN (10 CM X 12 CM), 50/CT 4CT/CASE: Brand: 3M™ TEGADERM™

## (undated) DEVICE — BOWL MED L 32OZ PLAS W/ MOLD GRAD EZ OPN PEEL PCH